# Patient Record
(demographics unavailable — no encounter records)

---

## 2024-10-28 NOTE — ASSESSMENT
[FreeTextEntry1] : The patient is a 71 year old with a PMH of HLD, osteoporosis and mitral valve prolapse who presents for evaluation after MRI showed  which upon review of the images is relatively mild. Her vascular RF's are fairly well controlled other than potentially her lipids which we will recheck, including Lipoprotein A.  Otherwise, agree with carotid US which is pending. Will need aggressive vascular risk factor control in the future. No need for aspirin at this time; can consider statin pending lipid panel.

## 2024-10-28 NOTE — PHYSICAL EXAM
[FreeTextEntry1] : Alert.  Fully oriented.  Speech and language are intact.  Cranial nerves II-XII are intact.  Motor exam reveals intact strength with individual muscle testing in bilateral upper and lower extremities.  Tone is normal.  Reflexes are normal.  Sensation is intact to light touch in distal extremities.  Finger-to-nose and heel-to-shin are intact.  Rapid alternating movements are normal in the upper and lower extremities.  Gait is normal.

## 2024-10-28 NOTE — HISTORY OF PRESENT ILLNESS
[FreeTextEntry1] : The patient is a 71 year old with a PMH of HLD, osteoporosis and mitral valve prolapse who presents for evaluation after MRI showed .  The patient had a recent MRI to workup hearing loss that was more significant on the R side after attempting to purchase new hearing aids. The MRI did not show any internal auditory canal pathology, but it did show some mild chronic microvascular ischemic changes in the white matter. The patient has no history of HTN, DM or migraines. She has mitral valve prolapse, for which she is following with cardiology (recent TTE and EKG 2024). She is scheduled for a carotid US this Friday with cardiology. She has no known history of stroke or TIA, although she does endorse an episode of confusion ~1.5 years ago while at the gym. She says that she felt confused while reading text messages that she sent on her phone because she did not recall sending them, and this confusion lasted ~5 min before resolving. She denies any numbness, tingling, weakness, headache, vision changes or difficulty with reading/language/speech during this time. She has had no significant memory issues, only some intermittent word-finding difficulty. She says that her sister was recently diagnosed with Alzheimer's, but she is not particularly worried about her own memory. Overall she is doing well -- she eats healthy and gets plenty of exercise. She has never smoked cigarettes and drinks alcohol socially. Otherwise, BP is well controlled. She exercises regularly and eats a healthy, balanced diet.   Reviewed the patient's most recent lipid panel from  with the following results: - Total cholesterol: 245 - HDL: 97 - LDL: 129

## 2024-11-05 NOTE — REASON FOR VISIT
[Symptom and Test Evaluation] : symptom and test evaluation [FreeTextEntry1] : 71F comes in for a visit. Patient had a MRI head and microvascular disease was noted. She is concerned about possible implications. No chest pain noted. Mild AI noted in 2022. Lipids are abnormal and an evaluation is completed.

## 2024-11-05 NOTE — DISCUSSION/SUMMARY
[FreeTextEntry1] : Microvascular disease. HLD echo, carotid and labs completed.  There is definitely a value of this patient being on a statin, but as she is reluctant to start the medication, I'm happy to follow her clinically.

## 2025-04-21 NOTE — ASSESSMENT
[FreeTextEntry1] : Enid Bolden is a 72 year old female with PMH of HLD, osteoporosis, MVP and elevated LpA who presents for neurological follow up of mild cerebral .  Plan: -Continue aggressive vascular risk factor control with PCP/Cardiology, BP goal <130/80 and A1c goal <6.5% -Continue Atorvastatin 40 mg in setting of elevated LpA -Repeat CMP and lipid panel for LDL goal <70 -Encouraged healthy lifestyle habits including regular exercise, Mediterranean diet, goal of 8 hours of sleep and stress management -Counselled on signs of stroke BEFAST and to call 911 with any new or worsening neurological symptoms -RTO in 6 months with Dr. Raymundo; will call with lab results

## 2025-04-21 NOTE — HISTORY OF PRESENT ILLNESS
[FreeTextEntry1] : Enid Bolden is a 72 year old female with PMH of HLD, osteoporosis, MVP and elevated LpA who presents for neurological follow up of mild cerebral . She was last seen by Dr. Raymundo in 10/2024.  Since last visit, she reports no new neurological symptoms. She completed a carotid US in 10/2024 which showed no significant hemodynamically significant stenosis. She reports being consistent with her Atorvastatin 40 mg about 60% of the time. BP today 110/73. She continues to live a healthy lifestyle. She reports no repeat blood work since her last visit. She is concerned about lack of studies with connections between elevated LpA and cerebral small vessel disease.